# Patient Record
(demographics unavailable — no encounter records)

---

## 2019-11-22 NOTE — EKG
[place] [address]
Phone:  [phone]                     ELECTROCARDIOGRAM REPORT      
_______________________________________________________________________________
 
Name:       JAYMIEGRISELDA               Room:           [place] [address]    ADM [place]  
..#:  [id number]      Account #:      [id number]  
Admission:  19     Attend Phys:    Tracey Magaña MD 
Discharge:               Date of Birth:  61  
         Report #: [id number]
    [id number]
_______________________________________________________________________________
THIS REPORT FOR:  //name//                      
 
                         [facility] ED
                                       
Test Date:    2019               Test Time:    14:11:03
Pat Name:     GRISELDA COON         Department:   
Patient ID:   [id number]            Room:         [facility]
Gender:       F                        Technician:   
:          1961               Requested By: Isa Salgado
Order Number: [id number] MD:   Donaldo Neri
                                 Measurements
Intervals                              Axis          
Rate:         110                      P:            77
KY:           151                      QRS:          84
QRSD:         116                      T:            46
QT:           350                                    
QTc:          474                                    
                           Interpretive Statements
Sinus tachycardia
LAE, consider biatrial enlargement
Incomplete right bundle branch block
Low voltage, precordial leads
nonspecific ST-T abnormalities
No previous ECG available for comparison
 
Electronically Signed On 2019 15:44:14 CST by Donaldo Neri
[email or web address]
 
 
 
 
 
 
 
 
 
 
 
 
 
 
 
 
  <ELECTRONICALLY SIGNED>
                                           By: Donaldo Neri MD, FAC     
  19     1544
D: 19 1411   _____________________________________
T: 19 1411   Donaldo Neri MD, FAC       /EPI
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
## 2019-11-22 NOTE — NUR
RECEIEVED REPORT FROM EMILEE FLORES IN ER OF EXPECTED ADMISSION AT 1615- DX:
SEPSIS, RENAL FAILURE, DECUBITUS ULCER, PRAVEEN- PT ARRIVED TO ROOM 220 VIA
BED AT 1640,  AND DAUGHTER AT SIDE- VS 98.4 20 156/72 112 93% ON 2L VIA
NC- CARDIAC MONITOR PLACED AS ORDERED, TRACING ST- PT A&O X4- INCONTINENT OF
B/B- BED REST IN PLACE NOTED WITH Q HOUR TURNS R/T MULTIPLE WOUNDS, ULCERS,
AND CANDIDIA- ABD OBESE/FIRM/RIGID, CELLULTIS WITH NOTED DRY DERMATITIS NOTED-
PT REPORTS LAST BM 11/21/19- 2+ BLE EDEMA WITH REDNESS/DRY VENOUS STASIS
NOTED- WOUND TO LILIANA HEELS AND COCCYX REPORTED TO BE EVALUATED IN ER PRIOR TO
TRANSFER PER WN, WITH DRESSINGS NOTED IN PLACE- RIGHT BREAST NOTED WITH RED
MOIST RASH, AREA CLEANED WITH WARM H2O, PAT DRY WITH INER DRY PLACED AS
INDICATED- LILIANA PANNUS FOLDS CLEANED WITH WARM H2O AND SOAP, PATTED DRY WITH
INNER DRY APPLIED AS INDICATED AS WELL- PICTURSE OBTAINED AND PLAED ON CHART
FOR VIEWING-ORDER OBTAINED FOR AIKEN PLACEMENT R/T MULTIPLE WOUNDS, AND PLACED
AS ORDERED THIS SHIFT- IV NOTED TO RIGHT AC INTACT, IVF INFUSSING AS
PRESCIBED- ID AND REHAB CONSULTS NOTED- BARIATRIC AIR MATTRESS DELIVERED THIS
SHIFT AND PT TRANSFERED TO AS INDICATED- PT CLEANED WELL ALL OVER WITH WARM
H2O AND SOAP AS INDCIATED R/T POOR HYGEINE UPON ADMISSION- PT REPORTS BE
LIVING WITH HUSABND AND DAUGHTER, BUT HAS NOT BEEN OUT OF HOUSE FOR 2.5 YEARS
AND LAYS ON THE COUCH AND IS INCONTINENT AT TIMES OF URINE AND BLADDER, AND
NOT ALWAYS ABLE TO MOVE SELF-CALL LIGHT AND PERSONAL BELONGINGS WITH IN REACH-
HOURLY ROUNDS IN PLACE R/T SAFETY/NEEDS- ALL NEEDS MET AT THIS TIME-[facility]

## 2019-11-23 NOTE — NUR
PROVIDED WOUND CARE WITH ASSISTANCE OF PCA. PATIENTED RECEIVED PAIN MEDICATION
PRIOR TO WOUND CARE. PATIENT EXPRESSED VERBALLY THAT PROCEDURE OF PROVIDING
WOUND CARE TO GROIN, BREAST AND PANNUS FOLDS WAS EXTREMELY PAINFUL. PATIENT
FATIGUED AFTER THIS AMOUNT OF WOUND CARE. FURTHER WOUND CARE DEFERRED TO
NOC SHIFT. REPORT GIVEN TO NOC RN ABOUT WOUND CARE NEEDED. PAINTED TOES WITH
BETADINE AS PER DR. LAINEZ'S ORDER. CALL LIGHT IN REACH. FAMILY AT BEDSIDE.

## 2019-11-23 NOTE — NUR
PT HAS C/O INCREASED PAIN FROM MOVEMENT D/T WOUNDS. DRAINAGE FROM WOUNDS
CAUSED NEED FOR BED CHANGE AND CLEANING OF THE SKIN WHICH PT DID NOT TOLERATE
WELL. PARTIAL RELIEF WITH PRN MEDICATION NOTED. PT UNABLE TO PREFORM ANY SELF
CARES AND REQUIRES MAX ASSIST WITH TURNS. PT IS CURRENTLY ASLEEP IN BED WITH
CALL LIGHT WITHIN REACH.

## 2019-11-23 NOTE — NUR
ASSUMED CARE AFTER REPORT APPROX 0730. OX4, ABLE TO EXPRESS NEEDS TO STAFF.
ASSESSMENT COMPLETE. VS OBTAINED, WNL, O2 SAT 96% 3L NC. CARDIAC MONITOR IN
PLACE, ST. CALL LIGHT IN REACH. HOURLY ROUNDING FOR SAFETY/NEEDS.

## 2019-11-24 NOTE — CON
[address]  [address]                    CONSULTATION                  
_______________________________________________________________________________
 
Name:       GRISELDA COON               Room:           [address] [place]  
M.R.#:  [id number]      Account #:      [id number]  
Admission:  11/22/19     Attend Phys:    Tracey Magaña MD 
Discharge:               Date of Birth:  07/17/61  
         Report #: [id number]
                                                                     [id number]  
_______________________________________________________________________________
THIS REPORT FOR:  //name//                      
 
CC: [facility] physician/PCP
    Tracey Magaña
 
DATE OF SERVICE:  11/23/2019
 
 
INFECTIOUS DISEASE CONSULTATION
 
ATTENDING PHYSICIAN:  Dr. Magaña.
 
REASON FOR EVALUATION:  Sepsis, possible pneumonitis, extensive decubitus ulcers
as well as possible skin and soft tissue infection.
 
HISTORY OF PRESENT ILLNESS:  Chart reviewed, patient examined.  This is a
58-year-old without known significant medical history really had not been
evaluated by medical personnel for a number of years, who apparently fell.  She
is unable to give details.  She had received some narcotic analgesics and is
quite somnolent.  She notes she has generalized pain and discomfort.  Does have
a chronic cough, productive of whitish sputum.  She says she is a smoker.  It is
not clear that she had fevers.  On evaluation, noted lactic acid elevated at
4.2.  Blood cultures collected now with growth of gram-positive cocci.  White
count was elevated at 16,000.
 
Hemodynamics are relatively stable, although she is mildly tachypneic and
tachycardic, empirically started on therapy with vancomycin, fluconazole, and
ceftriaxone.
 
ALLERGIES:  CODEINE.
 
PAST MEDICAL HISTORY:  On admission, denied any medical history.
 
SOCIAL HISTORY:  Occasional ethanol, smokes a pack a day for a number of years. 
No illicit drug use.
 
FAMILY HISTORY:  Noncontributory.
 
REVIEW OF SYSTEMS:  Somewhat not reliably obtained.
 
PHYSICAL EXAMINATION:
GENERAL:  She is obese.  She is lethargic to somnolent, does open her eyes, very
minimal response to questions, at least mildly encephalopathic, morbidly obese.
VITAL SIGNS:  Temperature 99.2, pulse 112, respirations 20, and blood pressure
123/65.
SKIN:  Warm.  Extensive superficial ulcerations around the limbs including the
 
 
 
[address] [place]  [address]                    CONSULTATION                  
_______________________________________________________________________________
 
Name:       GRISELDA COON               Room:           [address] [place]  
[facility]#:  [id number]      Account #:      [id number]  
Admission:  11/22/19     Attend Phys:    Tracey Magaña MD 
Discharge:               Date of Birth:  07/17/61  
         Report #: [id number]
                                                                     [id number]  
_______________________________________________________________________________
feet, chronic venous stasis insufficiency and dermatitis of the lower
extremities.  There is a large pannus, inflammatory eruption as well.
HEENT:  Nasal cannula oxygen in place.  Normocephalic.  Extraocular muscles
intact.
NECK:  Supple.
LUNGS:  She has diminished overall, a few scattered coarse breath sounds.
HEART:  Tachycardic, regular.
ABDOMEN:  Somewhat tender.
GENITOURINARY:  Deferred.
RECTAL:  Deferred.
 
LABORATORY DATA:  Arterial Doppler is limited in order to make any diagnostic
evaluations, no evidence of DVT.  Blood cultures described above, gram-positive
cocci, 2 out of 2.  CBC:  White count of 9.9, H and H 11.9, and platelets 283. 
Electrolytes:  Sodium 138, potassium 3.5, chloride 104, bicarbonate 25, anion
gap of 9, BUN and creatinine 56 and 1.3, the latter down from 2.5 on admission. 
LFTs generally unremarkable.  Alkaline phosphatase of 143, albumin 17, total
protein of 6.6, and estimated GFR of 42.  Urinalysis 0-5 white cells.  TSH of
1.749.
 
ASSESSMENT AND PLAN:  Sepsis.  I would presume a gram-positive cocci in blood
cultures represent a true positive.  Continue therapy with the combination, may
well be a skin and soft tissue source.  At this point, no evidence of urinary
tract infection, can exclude an early pneumonitis as well.  Continue supportive
care with supplemental oxygen as needed.  We will likely need to reduce
incentive spirometry when she is able.  We will await the identification of
blood cultures.  Continue wound care as prescribed.  Given her overall
situation, she is certainly at risk for having a more severe illness.  We will
monitor expectantly.
 
 
 
 
 
 
 
 
 
 
 
 
 
 
 
<ELECTRONICALLY SIGNED>
                                        By:  John Melgar MD           
11/24/19     0913
D: 11/23/19 1101_______________________________________
T: 11/23/19 1936Joraimundo Melgar MD              /nt

## 2019-11-24 NOTE — NUR
ASSUMED CARE OF PT AFTER REPORT AT 1930. PT A&OX4. VSS. PHYSICAL ASSESSMENT
COMPLETED AND CHARTED. PT ON O2 AT 3L NC. PT TRACING ST/PVC ON TELE. PT WITH
AIKEN TO DEPENDENT DRAIN.  PT COMPLAINED OF GENERALIZED BODY PAIN-MEDS GIVEN
PER MAR. PT TURNED TO SIDES. PT ABLE TO SLEEP WELL ON BED. CALL LIGHT WITHIN
REACH.

## 2019-11-25 NOTE — NUR
Pt out of room at CT, will attempt to assess later. EUGENIO spoke with Violet at
the Dept of [facility], regarding a hotline call. Violet requests to be updated once
disposition plans are in place [phone]. [facility] to
come out and see Pt at home point. Following.

## 2019-11-25 NOTE — NUR
ASSUMED PATIENT CARE AT 1900. ASSESSMENT COMPLETED AS CHARTED. VSS. PATIENT IS
ST ON THE MONITOR. PATIENT HAD INCREASED BLOOD PRESSURE DURING SHIFT. DOCTOR
JAYDON NOTIFIED, NO NEW ORDERS RECEIVED. WOUND CARE COMPLETED, PICTURES
TAKEN. REPOSITIONED PT Q2H. HOURLY ROUNDING IN PLACE FOR PATIENT SAFETY. CLWR.

## 2019-11-26 NOTE — NUR
Pt is A&O. Resides at home with her  and 20 year old dtr. Pt admits to
be debilitated at home, unable to complete most ADLs and IADLs. Pt's 
works out of the state and is gone most of the time. Dtr works at [employer]
and is a "normal 19 y/o." Pt states that she eats a lot of freezer meals and
fresh fruit/veggies. Pt states that she is able to walk, very short distances
with her walker. If she is unable to walk, Pt states that she puts a marquise on
the floor and uses the bathroom on the marquise. Pt spends most of her day in a
chair in the living room. Pt has a walker and cane that she uses for mobility,
states that she wants a wc. No home o2. Pt states that she hasn't seen a Dr in
20 years. Receives SSI, worked as an Exec Assistant. No hx of  or SNF. EUGENIO
spoke with Ryan from [facility] yesterday, he informed of the conditions of Pt's
home and health, concerns noted. Per Ryan, logistically, it would be
impossible for Pt to get in and out of her home, as it is situated [place]
with multiple steps to enter. Pt tearful and wanting help, but states that she
did not know how to access help. CM informed Pt of the hotline call to [facility],
and informed her that this could be an opportunity to locate the assistance
that she needs at home. Pt open to going to skilled at [place], CM provided Pt with
a list of SNF locations that accept her insurance. CM encouraged Pt to work
with therapies. CM to f/u regarding facility choice. CM to remain in contact
with Violet from [facility]. Following.

## 2019-11-26 NOTE — NUR
ASSUMED PATIENT CARE AT 1900. ASSESSMENT COMPLETED AS CHARTED. DR. VÁSQUEZ NOTIFIED OF ELEVATED BP, NEW ORDERS RECEIVED.  PATIENT IS
MED-SURG. HOURLY ROUNDING IN PLACE FOR PATIENT SAFETY. CLWR.

## 2019-11-26 NOTE — NUR
MANSOOR RESTING IN BED.  VSS.  NEW CARDIAC MEDICATIONS STARTED TODAY FOR
MANAGEMENT OF HTN.  WOUND CLEANING AND WOUND CARE PERFROMED TODAY.  HOURLY
ROUNDING COMPLETED FOR PATIENT CARE.  LUZMARIANET IN AUTO TURN SPECIALTY AIR BED
BUT REFUSES AUTO TURN FUNCTION AS IT CREATES AN UNCOMFORTABLE POSITION FOR
HER.  FREQUENT TURNS AND REPOSITIONING.

## 2019-11-26 NOTE — 2DMMODE
[place] [address]
Phone:  [phone] 2 D/M-MODE ECHOCARDIOGRAM     
_______________________________________________________________________________
 
Name:         JAYMIEGRISELDA              Room:          [address] [place] 
[facility]#:    [id number]     Account #:     [id number]  
Admission:    11/22/19    Attend Phys:   Tracey Magaña, 
Discharge:                Date of Birth: 07/17/61  
Date of Service: 11/26/19 1709  Report #:      [id number]
        [id number]
_______________________________________________________________________________
THIS REPORT FOR:  //name//                      
 
 
--------------- APPROVED REPORT --------------
 
 
Study performed:  11/26/2019 14:06:56
 
EXAM: Comprehensive 2D, Doppler, and color-flow 
Echocardiogram 
Patient Location: In-Patient   
Room #:  220     Status:  routine
 
      BSA:         2.44
HR: 99 bpm BP:          192/109 mmHg 
Rhythm: NSR     
 
Other Information 
Technically limited study due to  patient could not tolerate apical 
views.
 
Indications
Sepsis
 
2D Dimensions
IVSd:  13.17 (7-11mm) LVOT Diam:  20.87 (18-24mm) 
LVDd:  43.04 mm  
PWd:  11.64 (7-11mm) Ascending Ao:  31.85 (22-36mm)
LVDs:  21.70 (25-40mm) 
Aortic Root:  34.58 mm 
 
Aortic Valve
AoV Peak Arnaud.:  1.44 m/s 
AO Peak Gr.:  8.32 mmHg  
AO Mean Gr.:  5.01 mmHg  
AO V2 VTI:  22.06 cm  
 
Pulmonary Valve
PV Peak Arnaud.:  1.02 m/s PV Peak Gr.:  4.17 mmHg
 
Tricuspid Valve
    RAP Estimate:  5.00 mmHg
TR Peak Gr.:  40.08 mmHg RVSP:  45.00 mmHg
    PA Pressure:  45.00 mmHg
 
Left Ventricle
 
 
[place] [address]
Phone:  [phone]                     2 D/M-MODE ECHOCARDIOGRAM     
_______________________________________________________________________________
 
Name:         RUTH ANN COONNA              Room:          [address] [place] 
[facility]#:    [id number]     Account #:     [id number]  
Admission:    11/22/19    Attend Phys:   Tracey Magaña, 
Discharge:                Date of Birth: 07/17/61  
Date of Service: 11/26/19 1709  Report #:      [id number]
        [id number]
_______________________________________________________________________________
The left ventricle is normal size. There is normal LV segmental wall 
motion. Mild concentric left ventricular hypertrophy. Left 
ventricular systolic function is normal. The left ventricular 
ejection fraction is within the normal range. LVEF is 60-65%. This 
study is not technically sufficient to allow evaluation of the LV 
diastolic function.
 
Right Ventricle
The right ventricle is normal size. The right ventricular systolic 
function is normal.
 
Atria
The left atrium size is normal. The right atrium size is 
normal.
 
Aortic Valve
The aortic valve is normal in structure. No aortic regurgitation is 
present. There is no aortic valvular stenosis.
 
Mitral Valve
The mitral valve is normal in structure. There is no mitral valve 
regurgitation noted. No evidence of mitral valve stenosis.
 
Tricuspid Valve
The tricuspid valve is normal in structure. Mild tricuspid 
regurgitation. estimated pa pressure 50 mm Hg
 
Pulmonic Valve
Pulmonic valve is not well visualized. There is no pulmonic valvular 
regurgitation.
 
Great Vessels
The aortic root is normal in size. IVC is normal in size and 
collapses >50% with inspiration.
 
Pericardium
There is no pericardial effusion.
 
<Conclusion>
Mild concentric left ventricular hypertrophy.
LVEF is 60-65%.
 
 
[place] [address]
Phone:  [phone]                     2 D/M-MODE ECHOCARDIOGRAM     
_______________________________________________________________________________
 
Name:         JAYMIERUTH ANN VERANA              Room:          [address] [place] 
M.R.#:    [id number]     Account #:     [id number]  
Admission:    11/22/19    Attend Phys:   Tracey Magaña, 
Discharge:                Date of Birth: 07/17/61  
Date of Service: 11/26/19 1709  Report #:      [id number]
        [id number]
_______________________________________________________________________________
Mild tricuspid regurgitation.
estimated pa pressure 50 mm Hg
 
 
 
 
 
 
 
 
 
 
 
 
 
 
 
 
 
 
 
 
 
 
 
 
 
 
 
 
 
 
 
 
 
 
 
 
 
 
 
 
 
 
  <ELECTRONICALLY SIGNED>
                                           By: Kyle Goldstein MD, FACC      
  11/26/19 1709
D: 11/26/19 1709   _____________________________________
T: 11/26/19 1709   Kyle Goldstein MD, FACC        /INF

## 2019-11-27 NOTE — NUR
ASSUMED PT CARE AT 0730. ASSESSMENT COMPLETED AS CHARTED. ABLE TO MAKE NEEDS
KNOWN. RESTING IN BED MOST OF THE DAY. FAMILY AT BEDSIDE SOME OF THE DAY. UP
WITH SBA TO BSC R/T SOA ON EXCERSION. NO C/O PAIN OR DISCOMFORT. WILL CONTINUE
TO MONITOR.

## 2019-11-27 NOTE — NUR
CONSUTLED TO PLACE PICC FOR LONG TERM ATB THERAPY. CHART REVIEWED. SPOKE WITH
PT AND RISK/BENEFIT REVIEWED. PT VOICED UNDERSTANDING ADN AGREED TO PROCEDE.
RIGHT UPPER ARM ASSESSED WITH ULTRASOUND. RIGHT BASILIC IDENTIFIED AND NOTED
TO BE WIDLEY PATENT. 4FR SINGLE LUMAN POWER PICC PLACED WITH EASE. LINE
PRETRIMMED TO 44CM AND ADVANCED 44CM. GOOD BRIKS BLOOD RETURN NOTED AND
FLUSHED WITH EASE. SHERLOCK SHOWS LINE GOING DOEN TO SVC BUT COULD NOT READ
INTERNAL EKG. STAT CHEST XRAY ORDERED. LINE SECURED.

## 2019-11-27 NOTE — NUR
CM spoke with Kendy at [facility], they are unable to accept Pt d/t the need
for bariatric equipment, they are unable to meet that need. CM to fax
additional referrals today.

## 2019-11-27 NOTE — CON
[address]  [address]                    CONSULTATION                  
_______________________________________________________________________________
 
Name:       JAYMIEGRISELDA               Room:           [address]    ADM IN  
.R.#:  [id number]      Account #:      [id number]  
Admission:  11/22/19     Attend Phys:    Tracey Magaña MD 
Discharge:               Date of Birth:  07/17/61  
         Report #: [id number]
                                                                     [id number]  
_______________________________________________________________________________
THIS REPORT FOR:  //name//                      
 
CC: DIEGO physician/PCP
    Tracey Magaña
 
DATE OF SERVICE:  11/23/2019
 
 
ADMISSION DIAGNOSES:  Leg cellulitis, morbid obesity, septicemia, and
generalized debility.
 
HISTORY OF PRESENT ILLNESS:  The patient admitted for septicemia, generalized
debility, cellulitis and recent fall.  She has been housebound for roughly 2-1/2
years, relates increasing obesity, debility and lack of movement.  She mostly
lies on the couch during the day.  She denies fevers, chills, night sweats, or
malaise.  I reviewed her past medical history and medications.  She has
bilateral heel wounds, the left is a large full-thickness ulceration with
necrotic tissue.  The right heel wound is a stage 0 with some underlying skin
shadowing with intact epithelium.  She has chronic lymphedema and
lipodermatosclerosis of both legs.  She is on parenteral vancomycin, ceftriaxone
and fluconazole.  Arterial Dopplers was a limited study due to physiologic
impedance due to obesity.  Blood culture positive x 1 for gram-positive cocci.
 
LABORATORY DATA:  WBC 9.9, RBC 5.27, hemoglobin 11.9, hematocrit 38.3, and
platelets 283.  BUN 56, creatinine 1.3, glucose 76, albumin 1.7, and lactic acid
1.6.
 
PHYSICAL EXAMINATION:  Large full-thickness ulceration to the left inferior
calcaneus that measures roughly 8 cm diameter.  The wound has brown/black
fibronecrotic tissue with no granulation or healthy tissue.  There is no exposed
bone or tendon.  There is localized inflammation to the periwound.  No
underlying fluctuance or crepitation.  The right inferior heel has some
shadowing under the skin indicative of subdermal hemorrhage.  Both legs have
advanced lymphedema with likely venous insufficiency component.  Toenails are
dystrophic consistent with onychomycosis.  Her pedal pulses are nonpalpable, but
her feet are warm.
 
IMPRESSION:  Left inferior heel wound with soft tissue infection, no signs of
osteomyelitis.  Lymphedema, morbid obesity.
 
PLAN:  Excisional ulcer debridement to the left inferior heel with a scalpel to
remove subcutaneous tissue that included necrotic tissue from the fatty
subcutaneous layer.  This was an excisional surgical debridement with a #10
scalpel blade.  Portions of tissue were sent for both aerobic and anaerobic
culture.  Debridement occurred over an area of roughly 40 square cm.  Bleeding
was stopped with pressure.  The wound was cleansed with saline and dressed with
 
 
 
[place] [address]                    CONSULTATION                  
_______________________________________________________________________________
 
Name:       GRISELDA COON               Room:           [address] [place]  
M.R.#:  [id number]      Account #:      [id number]  
Admission:  11/22/19     Attend Phys:    Tracey Magaña MD 
Discharge:               Date of Birth:  07/17/61  
         Report #: [id number]
                                                                     [id number]  
_______________________________________________________________________________
Xeroform, 4 x 4s, and Kerlix gauze.  The patient to remain nonweightbearing on
the extremity and daily dressing changes as ordered by Sonia Sheppard, the
wound nurse.  I will follow the patient during her hospitalization.
 
 
 
 
 
 
 
 
 
 
 
 
 
 
 
 
 
 
 
 
 
 
 
 
 
 
 
 
 
 
 
 
 
 
 
 
 
 
 
 
 
<ELECTRONICALLY SIGNED>
                                        By:  Flaco Armstrong DPM          
11/27/19     1520
D: 11/23/19 1026_______________________________________
T: 11/23/19 1108Flaco Armstrong DPM             /nt

## 2019-11-27 NOTE — NUR
ASSUMED PT CARE AT 0730. ASSESSMENT COMPLETED AS CHARTED. ABLE TO MAKE NEEDS
KNOWN. UNABLE TO GET OUT OF BED DUE TO NWB ON RIGHT FOOT, WEIGHT, AND PAIN IN
RIGHT SHOULDER. PICC LINE INSERTED TODAY. PAIN MEDICATION GIVEN PRN FOR
SHOULDER PAIN PER EMAR. [id number] COMPLETED AS CHARTED. BARRIER CREAM PUT ON
BOTTOM AS NEEDED. CHANGED INTERDRY X1 TODAY. WILL CONTINUE TO MONITOR.

## 2019-11-28 NOTE — NUR
Pt is aox4, respirations are even and unlabored. Pt is in no acute distress.
Will continue to monitor.

## 2019-11-29 NOTE — NUR
ASSUMED PT CARE AT 0800, AOX4, BEDREST, O2 SAT 90'S RA. PT COMPLAINS OF
GENERALIZED PAIN. PAIN MEDS GIVEN. PT MULTIPLE WOUNDS NOTED. PT Q2 TURN. PT
ACCU CHECK. PT AIKEN CATH DRAINING WELL. PT ON MRSA ISOLATION. PT WAITING FOR
LTACH PLACEMENT/APPROVAL. VSS, AM ASSESSMENT AS CHARTED, HOURLY ROUNDING,
CALL LIGHT WITHIN, WILL CONTINUE TO MONITOR.

## 2019-11-29 NOTE — NUR
PT CARE ASSUMED AT 1930. SAT MAINTAINED IN O2. ALERT AND ORIENTED X4. C/O
PAIN, MEDICATION GIVEN PER EMAR. CALL LIGHT WITHIN REACH AND BED IN LOW
POSITION. DRESSING CHANGED ON ALL OF HER WOUNDS. HOURLY ROUNDING DONE FOR PT
SAFETY.

## 2019-11-30 NOTE — NUR
ASSUMED CARE OF PT APPROX 0730. REASSESSSMENT COMPLETED AS CHARTED.
MEDICATIONS GIVEN AS CHARTED. PT WORKED WITH THERAPY THIS MORNING, AND SAT ON
THE SIDE OF THE BED WITH THERAPY. PT O2 TITRATED TO 2L THIS AFTERNOON, PTS O2
MAINTAINED 95% ON 2L. PTS FAMILY AT BEDSIDE THIS EVENING.  WOUND CARE DONE
THIS EVENING, DRESSINGS CAHNGED AND CHARTED ON. PT TOLERATED WELL. SAFTEY
PRECAUTIONS UTILIZED, HOURLY ROUNDED.

## 2019-12-01 NOTE — NUR
PT CARE ASSUMED AT 1930. SAT MAINTAINED IN O2. ALERT AND ORIENTED X4. CALL
LIGHT WITHIN REACH AND BED IN LOW POSITION. C/O PAIN, MEDICATION GIVEN PER
EMAR. HOURLY ROUNDING DONE FOR PT SAFETY.

## 2019-12-01 NOTE — NUR
ASSUMED PT CARE AT 0730. ASSESSMENT COMPLETED AS CHARTED. ABLE TO MAKE NEEDS
KNOWN. ON BEDREST, [id number], A COUPLE WOUNDS REDRESSED AND PICTURED. C/O PAIN
IN SHOULDER AND ABD AND GAVE PRN PAIN MEDICATION PER EMAR. ISOLATION
MAINTAINED. VSS. WILL CONTINUE TO MONITOR.

## 2019-12-02 NOTE — NUR
ASSUMED PT CARE AT APPROX 1930. PT IS AWAKE AND ORIENTED X4. VSS ON 2L OF
O2/NC. NO DESATURATIONS NOTED. ASSESSMENT DONE AND CHARTED. WOUND CARE DONE.
PATIENT REPOSITIONED Q2HRS. PAIN MEDS GIVEN PRN PER EMAR. CALL LIGHT WITHIN
REACH. HOURLY ROUNDING DONE FOR PT SAFETY. HIGH FALL PRECAUTIONS IN PLACE.

## 2019-12-02 NOTE — NUR
Cm spoke with Yuri from [facility], [employer] was closed on Friday, [facility] in touch with
insurance this AM working on getting insurance auth. Updated Pt's nurse.

## 2019-12-03 NOTE — NUR
ASSUMED PT CARE AT APPROX 1930. PT IS AWAKE AND ORIENTED X4. VSS ON 2L OF
OO2/NC. PT IS REPOSITIONED EVERY 2HRS. WOUND CARE DONE AS NEEDED. PAIN MEDS
GIVEN PER MAR. CALL LIGHT WITHIN REACH. HIGH FALL PRECAUTIONS IN PLACE. HOURLY
ROUNDING DONE FOR PT SAFETY.

## 2019-12-04 NOTE — NUR
ASSUMED PT CARE AT APPROX 1930. PT IS AWAKE AND ORIENTED X4. VSS ON 2L OF
O2/NC. ASSESSMENT DONE AND CHARTED. WOUND CARE DONE. PT REPOSITIONED Q2H. MEDS
GIVEN PER EMAR. CALL LIGHT WITHIN REACH. HIGH FALL PRECAUTIONS IN PLACE.
HOURLY ROUNDING DONE FOR PT SAFETY.

## 2019-12-04 NOTE — NUR
CM informed by Yuri at [facility] that Pt does not have [facility] benefits. CM faxed
skilled referrals to:
[facility] p:[phone] [phone]
[facility] p:[phone] [phone]
 [facility] p:[phone]
[facility] p:[phone]
[facility] p:[phone]
[facility] p:[phone]
[facility] p:[phone] [phone]
[facility] p:[phone]
[facility] p:[phone] f:[phone]

## 2019-12-04 NOTE — NUR
PT TRANSFERRED TO ROOM 309 FROM TELE. ALERT AND ORIENTED. OBESED. ON BARIATIC
BED. MULTIPLE BODY WOUNDS. Q2 TURN. INCONTINENT OF BOWEL. IAKEN IN PLACE.
ISOLATIOB FOR MRSA. WILL CONTINUE TO MONITOR.

## 2019-12-04 NOTE — NUR
ASSUMED PT CARE REPORT RECEIVED FROM NURSE PT IS AOX4 ON RA. MEDSURG STATUS.
COMPLAINS OF PAIN. FENTANYL GIVEN. Q2TURN. NO FURTHER COMPLAINT. OUT OF BED OT
CHAIR WITH PHYSICALTHERAPIST HELP. IV ABX GIVEN. WOUND IN LOWER BACK
REDRESSED. PT HAD A BOWEL MVNT. WILL CONTINUE TO MONITOR

## 2019-12-05 NOTE — NUR
WOUND CARE NOTE:  REASSESSMENT
 
LEFT HEEL:  DRESSING HAD SATURATED THROUGH-SEROSANGUINEOUS DRAINAGE.  FOUL
ODOR NOTED, BUT STOPPED AFTER CLEANSING WITH SOAP AND WATER.  WOUND MEASURES
5.8X5.5X0.6.  CONTINUES WITH UNSTAGEABLE CLASSIFICATION.  80% OF WOUND BED
WITH BLACK, DRY ESCHAR 5% YELLOW, MOIST, ADHERENT ESCHAR.  15% RED, MOIST
GRANULATION TISSUE.  SEMAJ-WOUND WITH NEW EPITHELIUM.  APPLIED AQUACEL AG.
COVERED WITH ABD.  SECURED WITH KERLIX.  REAPPLIED OFFLOADING BOOT.
 
RIGHT HEEL:  EVOLVED DTI, NOW UNSTAGEABLE PRESSURE ULCER MEASURING 1.3X1.
BLACK, DEVITALIZED TISSUE.  HEALING, FLAT.  CLEANSED WITH SOAP AND WATER,
PATTED DRY.  REAPPLIED OFFLOADING BOOT.
 
ABDOMINAL PANNUS:  AREA IS SIGNIFICANTLY IMPROVED FROM LAST ASSESSMENT.
CLEANSED UNDER FOLD WITH SOAP AND WATER, DRIED WELL.  ABDOMINAL LESION TO THE
CENTER OF ABDOMINAL FOLD MEASURING 12.5X16X0.1.  RED, MOIST GRANULATION TISSUE
WITH ISLAND OF EPITHELIUM THROUGHOUT.  APPLIED OPTIFOAM AG.  APPLIED INTERDRY
AG IN BETWEEN ABDOMINAL FOLD, SINGLE LAYER.
 
LEFT SIDE OF ABDOMEN:  FULL THICKNESS ULCERATION MEASURING 8X8X0.2.  MOIST,
YELLOW WOUND BED.  CLEANSED WITH SOAP AND WATER, PATTED DRY.  APPLIED OPTIFOAM
AG AND SECURED WITH A TEGADERM.
 
 
LEFT ISCHIAL TUBEROSITY:  EVOLVED DTI TO AN UNSTAGEALBE PRESSURE ULCER.  WOUND
MEASURES 8.5X14X0.3.  BLACK, MOIST, ADHERENT ESCHAR TO APPROXIMATELY 90% OF
WOUND BED WITH 5% YELLOW ADHERENT ESCHAR AND 5% RED, NON-GRANULAR TISSUE.
FOUL ODOR NOTED.  DRAINING BROWN, SEROSANGUINEOUS DRAINAGE.  WOUND WAS
CLEANSED WITH SOAP AND WATER, PATTED DRY.  APPLIED IODOSORB GEL AND COVERED
WITH AND ABD THEN PAPER TAPE.
 
LEFT SUPERIOR BUTTOCK WOUND:  FULL THICKNESS ULCERATION MEASURING 1X1X0.2.
RED, MOIST WOUND BED.  CLEANSED WITH WOUND CLEANSER, PATTED DRY.
 
LEFT DISTAL MOST BUTTOCK WOUND:  FULL THICKNESS ULCERATION MEASURING
1.5X2.5X0.2.  MOIST, RED, YELLOW WOUND BED.  CLEANSED WITH SOAP AND WATER,
PATTED DRY.  APPLIED OPTIFOAM AG AND SECURED WITH AN OPSITE.
 
RIGHT BUTTOCK WOUNDS:  3 FULL THICKNESS ULCERATIONS CLUSTERED MEASURE
6.3X4.5X0.1.  MOIST, RED WOUND BEDS.  CLEANSED WITH WOUND CLEANSER, PATTED
DRY.  APPLIED OPTIFOAM AG AND SECURED WITH OPSITE.
 
EDUCATED PATIENT ON IMPORTANCE OF TURNING, CONTINUING WITH MARIBEL MATTRESS.
COMMUNICATED UNDERSTANDING.  EDUCATED IMPORTANCE OF CONTINUING WITH OFFLOADING
BOOTS, COMMUNICATED UNDERSTANDING.  EDUCATED ON LEFT ISCHIAL TUBEROSITY
UNSTAGEABLE PRESSUE ULCER CONCERNS, COMMUNICATED UNDERSTANDING.  SPOKE WITH
SURGEON UPDATING OF FINDINGS.
 
RECOMMEND
ENCOURAGE GOOD NUTRTION/HYDRATION FOR WOUND HEALING
CONTINUE WITH OFFLOADING BOOTS
CONTINUE WITH COMPELLA MARIBEL MATTRESS
TURN Q2 HOURS
LIMIT HOB <30 DEGREES IF PATIENT CAN TOLERATE
NEW WOUND CARE ORDERS OBTAINED
FOLLOW UP IN WOUND CENTER UPON DISCHARGE

## 2019-12-05 NOTE — NUR
SW received message from pt insurance that pt does not have SNF benefits (in
addition to no [facility] benefits) but that pt only has inpt rehab benefits on pt
specific plan/contract.  SW called insurance to confirm and discuss if there
are any other options based on medical necessity but insurance informed that
there is not way to change the contract, that this is just not benefits
available to pt through pt plan.  SW informed Dr Nash and also spoke with
rehab liaison Kym to review referral for inpt rehab.  SW to continue to
follow to assist with safe dc planning.

## 2019-12-05 NOTE — NUR
PT ALERT AND ORIENTED. ASSESSMENT AS DOCUMENTED. MULTIPLE WOUNDS. MEDS GIVEN
PER EMAR. PAIN MEDS GIVEN MULTIPLE TIMES THIS SHIFT. ON BARIATIC BED.
ISOLATION PRECAUTION IN PLACE. Q2 TURN. INCONTINENT OF BOWEL. HAD 2 BMs THIS
SHIFT. VOIDING VIA AIKEN. FALL PRECAUTION IN PLACE. CALL LIGHT WITHIN REACH.
WILL CONTINUE TO MONITOR.

## 2019-12-05 NOTE — NUR
REASSESSED RT UPPER ARM PICC LINE FOR BLOOD RETURN AFTER 30 MINUTE CATH RAMSES
DWELL TIME.  BRISK BLOOD RETURN OBTAINED.  5MLS OF BLOOD DRAWN AND WASTED
FROM PICC.  PICC FLUSHED EASILY WITH 20MLS NS.  NO ADDITIONAL CATH RAMSES GIVEN.

## 2019-12-05 NOTE — NUR
PT A&OX4 VSS. PT REMAINS ON CONTACT ISOLATION. PT IS ACCUCHECK, GLUCOSE
REMAINS IN RANGE. IV FENTANYL TO ADDRESS C/O DISCOMFORT THIS SHIFT, PT REPORTS
RELIEF W/ THIS TX. PT URINARY CATHETER IS PATENT, YELLOW URINE VISIBLE IN
COLLECTION BAG. PT INCONTINENT OF BOWELS. WOUND DRESSINGS CHANGED BY WOUND
CARE NURSE THIS SHIFT, INTERDRY PLACED TO ABD FOLDS. DISCUSSION OF POSSIBLE
FUTURE DEBRIDEMENT OF WOUND TO BUTTOCK, NO DECISION MADE AT THIS TIME. PT ON
2L O2 BY NC. LIDO PATCH TO R SHOULDER FOR PAIN RELIEF. NICOTINE PATCH PLACED
TO L SHOULDER FOR SMOKING CESSATION. PICC FLUSHED BY PACU RN THIS AFTERNOON
WITH ALEPLASE, LINE PATENT, FLUSHING AND DRAWING AT THIS TIME. PT REMAINS ON
CARB CONTROL DIET. PT RESTS IN BED WITH CALL LIGHT IN REACH. WILL CONTINUE TO
MONITOR.

## 2019-12-05 NOTE — NUR
REQUEST BY NURSING SUPERVISOR TO ADMINISTER CATH RAMSES TO RT UPPER ARM SINGLE
LUMEN PICC LINE.  ABLE TO FLUSH PICC WITHOUT DIFFICULTY BUT UNABLE TO OBTAIN
BLOOD RETURN.  CATH RAMSES ADMINISTERED AT 1550 TO RIGHT UPPER ARM PICC LINE.
LINE MARKED, DO NOT FLUSH.  NURSE, BREANNE, NOTIFIED TO NOT USE LINE.  WILL
RECHECK LINE FOR BLOOD RETURN AT 1620.

## 2019-12-06 NOTE — NUR
PT ALERT AND ORIENTED. FOREGETFUL AT TIMES. VSS ON 2L O2. PT SLEPT WELL THIS
SHIFT. MEDS GIVEN PER EMAR. PAIN MEDS GIVEN MULTIPLE TIMES THIS SHIFT. Q2
TURN. BM NOTED THIS SHIFT.  ISOLATION PRECAUTION IN PLACE. CALL LIGHT WITHIN
REACH. HOURLY ROUNDINGS MADE. WILL CONTINUE TO MONITOR.

## 2019-12-06 NOTE — NUR
PT A&OX4 VSS. BEDSIDE DEBRIDEMENT OF WOUND ON L BUTTOCK THIS AM. PT UP IN
KAREN TO RECLINER BY PT. DRESSING VISUALIZED, AND REDRESSED WITH DR NIELSON.
ACCUCHECKS TO MONITOR BLOOD GLUCOSE. DRESSINGS TO INNER THIGHS APPLIED. PT
RESTS IN BED WITH CALL LIGHT IN REACH. URINARY CATHETER PATENT, YELLOW URINE
VISIBLE IN COLLECTION BAG. WILL CONTINUE TO MONITOR

## 2019-12-06 NOTE — NUR
Pt working with PT and then with nursing.  SW spoke with Dr Nash regarding
pt plan of care and dc planning.  DC pending inpt rehab response. Pt not ready
to be able to dc home safely at this time, needs to continue working with
therapies and improve.  SW to continue to follow to assist with safe dc
planning.

## 2019-12-07 NOTE — NUR
PATIENT AWAKE IN BED WITH FAMILY AT BEDSIDE. PATIENT TURNED AND REPOSITION Q2
HOURS AND ENCOURAGED TO MOVE AROUND AS ABLE. ALL SAFETY MEASURES MAINTAINED.
PATIENT DENIES FURTHER NEEDS AT THIS TIME.

## 2019-12-08 NOTE — NUR
ASSUMED CARE AT 0730.  ALERT ORIENTED PLEASANT COOPERATIVE.  HX OF RENAL
FAILURE AND WOUNDS.  PT. IS IN BARIATRIC BED AND IN MRSA ISOLATION. AIKEN
PATENT WITH NAHUN URINE TO DD BAG.  APPETITE GOOD NO GERD OR ABDOMINAL PAIN
TODAY.  HAD HARD FORMED BM.  TURNED EVERY 2 HRS FOR WOUND CARE.  PTS. BP HAS
BEEN LOW BUT WITHOUT SYMPTOMS.  O2 SAT LOW AT TIMES O2 ON AT 2-4 L/M PER N/C
MEDICATED FOR C/O PAIN WITH SOME RELIEF STATED.  DR.. THURSTON AND HIMS DR. TREADWELL.

## 2019-12-08 NOTE — NUR
PT ALERT AND ORIENTED. ASSESSMENT AS DOCUMENTED. PT SLEPT OFF AND ON THIS
SHIFT. PAIN MEDS GIVEN MULTIPLE TIMES THIS SHIFT. PT COMPLAINED OF CHEST PAIN
WHICH SHE RATED A 6/10 AROUND 0050. EKG DONE AND SHOWED SINUS TACH OF 
WHICH IS NOT UNSUAL FOR HER. PT SAID SHE FEELS ITS INDESTION. SPRITE WAS
GIVEN AND PT WENT BACK TO SLEEP. AT ABOUT 0350. PT WAS UP AND COMPLAINING OF
INDIGESTION. MYLANTA WAS GIVEN. VS WAS ALSO OBTAINED. BP WAS 84/40. . PT
WAS SATTING IN THE 80'S ON RA. I PUT HER ON 2L, SHE WAS STILL SATTING IN THE
80'S AFTER FEW MINS SO I BUMPED HER TO 4L. PT'S O2 SAT CAME UP TO 96% ON 4L. i
TURNED HER BACK DOWN TO 2L AND SAT WAS 94%. CALL LIGHT WITHIN REACH. HOURLY
ROUNDINGS MADE. ISOLATION PRECAUTION IN PLACE. WILL CONTINUE TO MONITOR.

## 2019-12-09 NOTE — [place] [address]
Phone:  [phone]                     ELECTROCARDIOGRAM REPORT      
_______________________________________________________________________________
 
Name:       RUTH ANN COONNA               Room:           [address]    ADM IN  
M.R.#:  [id number]      Account #:      [id number]  
Admission:  19     Attend Phys:    Tracey Magaña MD 
Discharge:               Date of Birth:  61  
         Report #: [id number]
    [id number]
_______________________________________________________________________________
THIS REPORT FOR:  //name//                      
 
                          [facility]
                                       
Test Date:    2019               Test Time:    00:55:22
Pat Name:     GRISELDA COON         Department:   
Patient ID:   [id number]            Room:         [address]
Gender:       F                        Technician:   ZNIA
:          1961               Requested By: Tracey Magaña
Order Number: [id number]    Reading MD:   Kyle Goldstein
                                 Measurements
Intervals                              Axis          
Rate:         102                      P:            49
WY:           140                      QRS:          74
QRSD:         86                       T:            7
QT:           335                                    
QTc:          437                                    
                           Interpretive Statements
Sinus tachycardia
Atrial premature complex
Probable left atrial enlargement
Low voltage, precordial leads
Abnormal R-wave progression, early transition
Borderline T abnormalities, anterior leads
Compared to ECG 2019 14:11:03
Atrial premature complex(es) now present
 
 
Electronically Signed On 2019 14:25:31 CST by Kyle Goldstein
[email or web address]
 
 
 
 
 
 
 
 
 
 
 
 
 
  <ELECTRONICALLY SIGNED>
                                           By: Kyle Goldstein MD, [facility]      
  19     1425
D: 19 0055   _____________________________________
T: 19 0055   Kyle Goldstein MD, [facility]        /EPI

## 2019-12-09 NOTE — NUR
steve sent referral to Lela @ [phone] &
Shayy Blankenship @ [facility] @ [phone].

## 2019-12-09 NOTE — NUR
PT SLEPT WELL AFTER MIDNIGHT, RECEIVING IV AND PO PAIN MED WITH GOOD RESULT.
O2 3L NC WHILE SLEEPING TO KEEP SATS >92% LISINOPRIL HELD AT HS. ROSALVA PICC, IV
ABX GIVEN AS ORDERED. AM LABS DRAWN. DRESSING CHANGED TO LBUTTOCK THIS SHIFT
DUE TO SOILING. DRSG TO R BUTTOCK, THIGH, ABDOMEN AND INTERDRY TO PANNUS AND
GROIN CDI. HS ACCUCEHCK 142, NO MEDS GIVEN. L FOOT DRSG CDI, PRAFO BOOTS ON
BLE. ON LOW AIR LOSS PASTORA BED, PT TURNED AND REPOSITIONED Q2 HOURS AND PRN AS
SHE WOULD ALLOW AND REQUESTED. BLE ELEVATED ON PILLOWS PER PT REQUEST. NO CO
CHEST PAIN OR GERD OVERNIGHT. REMAINS ON CONTACT ISOLATION FOR MRSA. WARM KPAD
TO R SHOULDER WITH GOOD RELIEF OF PAIN. AIKEN DRAINING YELLOW URINE. CM
FOLLOWING FOR INSURANCE APPROVAL TO DISCHARGE SNF. AOX4, ABLE TO USE CALL LITE
AND MAKE NEEDS KNOWN.

## 2019-12-09 NOTE — NUR
PT A&OX4 VSS. PT UP TO RECLINER FOR MEALS TODAY. PT TO GYM FOR PT TODAY. IV
FENTANYL GIVEN FOLLOWING PT/OT. PT TRANSFERS WITH 2 OR MORE STAFF AND KARNE.
IV VANC ADMINISTERED AS ORDERED. PO TRAMADOL FOR PAIN MANAGEMENT AS WELL. PT
PICC TO [facility] REMAINS PATENT. CASE MANAGEMENT WORKING ON PLACEMENT FOR THIS PT.
PT REMAINS ON CARB CONTROL DIET, ACCUCHECKS AC/HS. NO INSULIN ADMINISTERED,
METFORMIN FOR GLUCOSE MANAGEMENT. PT SAT 95-97% ON 2L O2 NC. PT REMAINS ON
CONTACT ISOLATION. INTERMITTENT HEAT THERAPY TO SHOULDER FOR COMFORT AS WELL
AS LIDOCAINE PATCH. URINARY CATHETER PATENT, YELLOW URINE VISIBLE IN
COLLECTION BAG. PT RESTS IN ROOM WITH CALL LIGHT IN REACH. WILL CONTINUE TO
MONITOR.

## 2019-12-10 NOTE — NUR
Needs verbal order to extend PT 1x a week for 2 weeks starting the week of August 19th to continue working on transfers     Verbal can be left on VM PT UP IN CHAIR AT START OF SHIFT. DEPENDENT LIFT USING KAREN TO TRANSFER PT
BACK INTO BED, UNABLE TO STAND FOR WEIGHT. PT ON LOW AIR LOSS PASTORA BED, TURNED
AND REPOSITIONED Q2 HOURS AND PRN AS SHE REQUESTED AND ALLOWED, FOR COMFORT.
REFUSING PRAFO BOOTS OVERNIGHT, USING PILLOWS TO ELEVATE HEELS OFF OF BED.
DRESSING TO L FOOT AND BUTTOCKS/THIGHS CHANGED OVERNIGHT. INTERDRY REPLACED TO
PANNUS. PT RECEIVING FENTANYL X2 THIS SHIFT FOR CO PAIN WITH FAIR RESULT.
AIKEN DRAINING GOOD AMOUNT YELLOW URINE. ROSALVA PICC SL, ABX GIVEN AS ORDERED, AM
LABS DRAWN. K PAD TO R SHOULDER. CM WORKING ON DISCHARGE PLACEMENT. REMAINS ON
CONTACT ISOLATION. ABLE TO USE CALL LITE AND MAKE NEEDS KNOWN.

## 2019-12-10 NOTE — NUR
GARY followed up with Ashley from [facility] who stated that they will
continue to consider pt for admissions to their inpt rehab pending end date to
iv abx and insurance auth.  GARY faxed requested updates to Ashley and then
called Ashley who said that they are willing to accept now that IV abx
complete; just pending insurance auth now that Ashley submitted for auth at
around 2:30 pm today.  GARY to continue to follow to assist with finalizing safe
dc plan/inpt rehab placement.

## 2019-12-11 NOTE — NUR
SW followed up with [facility] this morning to check on status of
insurance auth; they have not received notification from pt insurance as of
yet.  SW to continue to follow to assist with finalizing dc plan/inpt rehab
placement.

## 2019-12-11 NOTE — NUR
PT SITTING IN CHAIR THIS AFTERNOON UP WITH KAREN. WHEN PT IS IN BED SHE TURNS
BACK ON TO BACK. ENCOURAGED PT TO LIE ON HER SIDE TO PROMOTE WOUND HEALING. PT
INCONTINENT OF STOOL. AIKEN CATH DRAINING CLEAR YELLOW URINE. PT TOLERATING
PO. PICC LINE D/C TODAY. PAIN WELL CONTROLLED. WOUND CARE PERFORMED. AWAITING
INSURANCE AUTH FOR REHAB.

## 2019-12-11 NOTE — NUR
PT ALERT AND ORIENTED. VSS ON 2L NC. MEDS GIVEN PER EMAR. PT SLEPT MOST OF
SHIFT. ISOLATION PRECAUTION IN PLACE. PAIN MEDS GIVNE THIS SHIFT. DRESSING TO
LT ISCHIAL TUBEROSITY CHANGED THIS SHIFT.  PT ON BARIATRIC BED. HEELS
OFFLOADED. CALL LIGHT WITHIN REACH. HOURLY ROUNDINGS MADE. WILL CONTINUE TO
MONITOR.

## 2019-12-12 NOTE — NUR
PT A&OX4 VSS. PT REMAINS ON CARB CONTROL DIET.  PT WORKED WITH THERAPY THIS
AM. PT UP TO RECLINER THIS AFTERNOON. PT RESTED IN ROOM WITH CALL LIGHT IN
REACH. PT INCONTINENT OF BOWELS X1 THIS SHIFT. STOOL WAS FORMED. PT ACUCHECK
AC/HS. PICC LINE DC'D PRIOR TO THIS NURSE'S SHIFT, NO IV ACCESS ON THIS PT AT
THIS TIME. PT TOLERATES PO MEDS W/O COMPLAINT. DRESSING TO L BUTTOCK C/D/I. PT
TO DC WITH AIKEN IN PLACE. AIKEN REMAINS PATENT, LG AMT OF YELLOW URINE
VISIBLE IN COLLECTION BAG. PT O2 SAT 97% ON 2L BY NC. REPORT TO GABRIEL AT [facility].  PAPERWORK ACCOMPANIES PT WITH TRANSPORTERS. PT LEAVES UNIT ON
STRETCHER, WITH ALL PERSONAL BELONGINGS.

## 2019-12-12 NOTE — NUR
PT ALERT AND ORIENTED. VSS ON 2L. PT SLEPT WELL THIS SHIFT. PAIN MED GIVEN X1
THIS SHIFT. BM NOTED THIS SHIFT. Q2 TURN. INCONTIMENT OF BOWEL. ISOLATION IN
PLACE. PT DENIES N/V THIS SHIFT. 1850ML EMPTIED OUT OF AIKEN THIS SHIFT AM.
CALL LIGHT WITHIN REACH. HOURLY ROUNDINGS MADE. WILL CONTINUE TO MONITOR.

## 2019-12-12 NOTE — NUR
SW received call from Paulina at [facility] providing final acceptance
and stating pt insurance provided authorization. Transportation by stretcher
van arranged for 1400.  Pt nurse and Dr Kassidy conner.  Chart copied for
continuation of care.  Final dc orders to be faxed to [facility].
fax [phone]
